# Patient Record
Sex: FEMALE | Race: BLACK OR AFRICAN AMERICAN | NOT HISPANIC OR LATINO | Employment: OTHER | URBAN - METROPOLITAN AREA
[De-identification: names, ages, dates, MRNs, and addresses within clinical notes are randomized per-mention and may not be internally consistent; named-entity substitution may affect disease eponyms.]

---

## 2021-12-03 ENCOUNTER — NURSE TRIAGE (OUTPATIENT)
Dept: ONCOLOGY | Facility: CLINIC | Age: 76
End: 2021-12-03
Payer: COMMERCIAL

## 2022-01-14 DIAGNOSIS — M06.9 RA (RHEUMATOID ARTHRITIS) (H): ICD-10-CM

## 2022-03-24 NOTE — TELEPHONE ENCOUNTER
DIAGNOSIS: Foot pain bilateral // Self Referral    APPOINTMENT DATE: 4/19/2022, 3:20 PM    NOTES STATUS DETAILS   OFFICE NOTE from other specialist Care Everywhere 3/18/2022 office visit with Abi Hamilton DPM ( Specialty)     5/28/2020, 12/20/18, 12/22/17, 4/18/17 Office visit with Que Phillips DPM ([facility])    MEDICATION LIST Internal    LABS     XRAYS (IMAGES & REPORTS) Internal/ Care Everywhere XR Six Foot Standing: 3/17/15    [facility]:  - XR Foot Right: 3/18/2022  - XR Foot Left: 3/18/2022    [facility]:  - XR Foot Left: 4/18/17, 7/19/13     3/24/2022 8:49am Fax requests sent to [facility] and [facility] for images noted above. -Pj

## 2022-03-28 ENCOUNTER — MEDICAL CORRESPONDENCE (OUTPATIENT)
Dept: HEALTH INFORMATION MANAGEMENT | Facility: CLINIC | Age: 77
End: 2022-03-28
Payer: COMMERCIAL

## 2022-03-31 ENCOUNTER — TRANSCRIBE ORDERS (OUTPATIENT)
Dept: OTHER | Age: 77
End: 2022-03-31
Payer: COMMERCIAL

## 2022-03-31 DIAGNOSIS — M20.002 FINGER DEFORMITY, ACQUIRED, LEFT: Primary | ICD-10-CM

## 2022-04-06 ENCOUNTER — OFFICE VISIT (OUTPATIENT)
Dept: PODIATRY | Facility: CLINIC | Age: 77
End: 2022-04-06
Payer: COMMERCIAL

## 2022-04-06 VITALS
SYSTOLIC BLOOD PRESSURE: 199 MMHG | BODY MASS INDEX: 19.26 KG/M2 | HEART RATE: 80 BPM | DIASTOLIC BLOOD PRESSURE: 99 MMHG | WEIGHT: 130 LBS | HEIGHT: 69 IN

## 2022-04-06 DIAGNOSIS — M79.671 PAIN IN BOTH FEET: ICD-10-CM

## 2022-04-06 DIAGNOSIS — L03.119 CELLULITIS AND ABSCESS OF FOOT, EXCEPT TOES: ICD-10-CM

## 2022-04-06 DIAGNOSIS — M05.772 RHEUMATOID ARTHRITIS INVOLVING BOTH FEET WITH POSITIVE RHEUMATOID FACTOR (H): ICD-10-CM

## 2022-04-06 DIAGNOSIS — M05.771 RHEUMATOID ARTHRITIS INVOLVING BOTH FEET WITH POSITIVE RHEUMATOID FACTOR (H): ICD-10-CM

## 2022-04-06 DIAGNOSIS — M79.672 PAIN IN BOTH FEET: ICD-10-CM

## 2022-04-06 DIAGNOSIS — S90.822S BLISTER OF LEFT FOOT, SEQUELA: Primary | ICD-10-CM

## 2022-04-06 DIAGNOSIS — L02.619 CELLULITIS AND ABSCESS OF FOOT, EXCEPT TOES: ICD-10-CM

## 2022-04-06 PROCEDURE — 99214 OFFICE O/P EST MOD 30 MIN: CPT | Mod: 25 | Performed by: PODIATRIST

## 2022-04-06 PROCEDURE — 97597 DBRDMT OPN WND 1ST 20 CM/<: CPT | Performed by: PODIATRIST

## 2022-04-06 RX ORDER — CEPHALEXIN 500 MG/1
500 CAPSULE ORAL 3 TIMES DAILY
Qty: 42 CAPSULE | Refills: 0 | Status: SHIPPED | OUTPATIENT
Start: 2022-04-06

## 2022-04-06 ASSESSMENT — PAIN SCALES - GENERAL: PAINLEVEL: WORST PAIN (10)

## 2022-04-06 NOTE — NURSING NOTE
DME FITTING    Relevant Diagnosis: Blister of left foot, [facility]  DH Shoe was fit on patient's left foot    Person(s) involved in teaching:   Patient    Brace was applied in standard Manner:  Yes  Brace fit well:  Yes  Patient reports brace to fit comfortably:  Yes    Education:   Patient shown self application and removal of brace: Yes  Patient shown how to adjust brace fit, if necessary: Yes  Patient educated on billing and return policy: Yes  Patient confirmed understanding when and how to contact clinic with concerns: Yes

## 2022-04-06 NOTE — PROGRESS NOTES
Past Medical History:   Diagnosis Date     Other specified drug dependence, continuous      Rheumatoid arthritis(714.0) (H)      Patient Active Problem List   Diagnosis     Cystocele, midline     RA (rheumatoid arthritis) (H)     S/P total knee arthroplasty     Total knee replacement status     Left knee pain     Edema     Past Surgical History:   Procedure Laterality Date     ARTHROPLASTY KNEE Left 4/30/2015    Procedure: ARTHROPLASTY KNEE;  Surgeon: Abby Cortez MD;  Location: [facility]     bunionectiomy bilateral       XR HAND SURGERY TANYA LEFT       Social History     Socioeconomic History     Marital status: Single     Spouse name: Not on file     Number of children: Not on file     Years of education: Not on file     Highest education level: Not on file   Occupational History     Not on file   Tobacco Use     Smoking status: Current Every Day Smoker     Smokeless tobacco: Not on file     Tobacco comment: too much   Substance and Sexual Activity     Alcohol use: Yes     Comment: occ     Drug use: No     Comment: in the past     Sexual activity: Yes     Partners: Male   Other Topics Concern     Not on file   Social History Narrative     Not on file     Social Determinants of Health     Financial Resource Strain: Not on file   Food Insecurity: Not on file   Transportation Needs: Not on file   Physical Activity: Not on file   Stress: Not on file   Social Connections: Not on file   Intimate Partner Violence: Not on file   Housing Stability: Not on file     Family History   Problem Relation Age of Onset     Breast Cancer Mother      Heart Disease Mother         die from it     Diabetes Father      Diabetes Brother      A1c      5.8                  4/19/21              SUBJECTIVE FINDINGS:  A 77-year-old female presents with son for left foot pain.  She relates it hurts in the arch.  For about the last week or so, it has really started hurting.  She has always had some pain in the left medial arch prominence  bilaterally, at least for several months.  Relates no injury.  Relates no fever or chills.  No specific treatment.  She relates she does have extra-depth shoes with custom foot orthotics and she wears those.  She relates she has been soaking her feet in vinegar and water.  I did review previous notes.  She was seen on 03/18 with Dr. Hamilton; notes and previous Rheumatology notes reviewed as noted in the EMR.    OBJECTIVE FINDINGS:  DP and PT are 2/4 bilaterally.  She has a flat foot type with medial arch prominence bilaterally.  She has laterally deviated toes, 1 through 5 bilaterally.  Dorsally contracted digits bilaterally.  Decreased range of motion bilaterally.  She has a left plantar arch blister with underlying ulcer that is 4 x 2.5 cm.  There is pustular drainage, positive erythema and edema.  Positive tenderness to palpation.  No odor, no calor.  It is deep through the dermis into the subcutaneous tissues.    ASSESSMENT AND PLAN:  Blister with underlying ulceration and infection, left foot.  She has foot pain bilaterally.  She has rheumatoid arthritis and foot deformities.  Diagnosis and treatment options discussed with her.  The blister was sharp debrided through the dermis into the subcutaneous tissues with a tissue cutter.  No anesthesia needed and local wound care done upon consent.  For the cellulitis, prescription for Keflex given and use discussed with her.  DH II shoe dispensed and use discussed with her.  Dressing supplies dispensed and use discussed with her.  I rinsed this with Wound Vashe, applied Iodosorb and a sterile dressing today.  I am going to have her do that daily.  She relates she is homebound.  She relates she does not leave the house without assistance or cares and feels she needs home nursing to help her with this.  Home nursing ordered for daily dressing changes.  I gave her a prescription for Voltaren gel for her foot pain and use discussed with her.  I do not want her using this  on the ulcer.  Return to clinic and see me Monday at the [facility].  DH II shoe dispensed and use discussed with her.  Previous notes reviewed.          High level of medical decision making with number and complexity of problems addressed- high, amount and/or complexity of data to be reviewed and analyzed-moderate, risk of complications and/or morbidity or mortality of patient management- high.

## 2022-04-06 NOTE — LETTER
4/6/2022         RE: Yarelis Matamoros  [address]  [address]  [place] [address]        Dear Colleague,    Thank you for referring your patient, Yarelis Matamoros, to the [facility]. Please see a copy of my visit note below.    Past Medical History:   Diagnosis Date     Other specified drug dependence, continuous      Rheumatoid arthritis(714.0) (H)      Patient Active Problem List   Diagnosis     Cystocele, midline     RA (rheumatoid arthritis) (H)     S/P total knee arthroplasty     Total knee replacement status     Left knee pain     Edema     Past Surgical History:   Procedure Laterality Date     ARTHROPLASTY KNEE Left 4/30/2015    Procedure: ARTHROPLASTY KNEE;  Surgeon: Abby Cortez MD;  [facility]     bunionectiomy bilateral       XR HAND SURGERY TANYA LEFT       Social History     Socioeconomic History     Marital status: Single     Spouse name: Not on file     Number of children: Not on file     Years of education: Not on file     Highest education level: Not on file   Occupational History     Not on file   Tobacco Use     Smoking status: Current Every Day Smoker     Smokeless tobacco: Not on file     Tobacco comment: too much   Substance and Sexual Activity     Alcohol use: Yes     Comment: occ     Drug use: No     Comment: in the past     Sexual activity: Yes     Partners: Male   Other Topics Concern     Not on file   Social History Narrative     Not on file     Social Determinants of Health     Financial Resource Strain: Not on file   Food Insecurity: Not on file   Transportation Needs: Not on file   Physical Activity: Not on file   Stress: Not on file   Social Connections: Not on file   Intimate Partner Violence: Not on file   Housing Stability: Not on file     Family History   Problem Relation Age of Onset     Breast Cancer Mother      Heart Disease Mother         die from it     Diabetes Father      Diabetes Brother      A1c      5.8                   4/19/21              SUBJECTIVE FINDINGS:  A 77-year-old female presents with son for left foot pain.  She relates it hurts in the arch.  For about the last week or so, it has really started hurting.  She has always had some pain in the left medial arch prominence bilaterally, at least for several months.  Relates no injury.  Relates no fever or chills.  No specific treatment.  She relates she does have extra-depth shoes with custom foot orthotics and she wears those.  She relates she has been soaking her feet in vinegar and water.  I did review previous notes.  She was seen on 03/18 with Dr. Hamilton; notes and previous Rheumatology notes reviewed as noted in the EMR.    OBJECTIVE FINDINGS:  DP and PT are 2/4 bilaterally.  She has a flat foot type with medial arch prominence bilaterally.  She has laterally deviated toes, 1 through 5 bilaterally.  Dorsally contracted digits bilaterally.  Decreased range of motion bilaterally.  She has a left plantar arch blister with underlying ulcer that is 4 x 2.5 cm.  There is pustular drainage, positive erythema and edema.  Positive tenderness to palpation.  No odor, no calor.  It is deep through the dermis into the subcutaneous tissues.    ASSESSMENT AND PLAN:  Blister with underlying ulceration and infection, left foot.  She has foot pain bilaterally.  She has rheumatoid arthritis and foot deformities.  Diagnosis and treatment options discussed with her.  The blister was sharp debrided through the dermis into the subcutaneous tissues with a tissue cutter.  No anesthesia needed and local wound care done upon consent.  For the cellulitis, prescription for Keflex given and use discussed with her.  DH II shoe dispensed and use discussed with her.  Dressing supplies dispensed and use discussed with her.  I rinsed this with Wound Vashe, applied Iodosorb and a sterile dressing today.  I am going to have her do that daily.  She relates she is homebound.  She relates she does not leave  "the house without assistance or cares and feels she needs home nursing to help her with this.  Home nursing ordered for daily dressing changes.  I gave her a prescription for Voltaren gel for her foot pain and use discussed with her.  I do not want her using this on the ulcer.  Return to clinic and see me Monday at the [facility].  DH II shoe dispensed and use discussed with her.  Previous notes reviewed.          High level of medical decision making with number and complexity of problems addressed- high, amount and/or complexity of data to be reviewed and analyzed-moderate, risk of complications and/or morbidity or mortality of patient management- high.      Yarelis Matamoros's chief complaint for this visit includes:  Chief Complaint   Patient presents with     New Patient     foot pain bilateral/ self ref / [facility] // Pending Imgs (PN)- BNT     PCP: Neftaly Arias    Referring Provider:  No referring provider defined for this encounter.    BP (!) 199/99 (BP Location: Left arm, Patient Position: Sitting, Cuff Size: Adult Regular)   Pulse 80   Ht 1.753 m (5' 9\")   Wt 59 kg (130 lb)   BMI 19.20 kg/m    Worst Pain (10)        Allergies   Allergen Reactions     Ciprofloxacin      Hypersensitivity       Clindamycin      hypersensitivity     Varenicline      hypersensitivity         Do you need any medication refills at today's visit?no  Isrrael Kelly CMA            Again, thank you for allowing me to participate in the care of your patient.        Sincerely,        Jose Ricketts DPM    "

## 2022-04-06 NOTE — PROGRESS NOTES
"Yarelis Matamoros's chief complaint for this visit includes:  Chief Complaint   Patient presents with     New Patient     foot pain bilateral/ self ref / [facility] // Pending Imgs (PN)- BNT     PCP: Neftaly Arias    Referring Provider:  No referring provider defined for this encounter.    BP (!) 199/99 (BP Location: Left arm, Patient Position: Sitting, Cuff Size: Adult Regular)   Pulse 80   Ht 1.753 m (5' 9\")   Wt 59 kg (130 lb)   BMI 19.20 kg/m    Worst Pain (10)        Allergies   Allergen Reactions     Ciprofloxacin      Hypersensitivity       Clindamycin      hypersensitivity     Varenicline      hypersensitivity         Do you need any medication refills at today's visit?no  Isrrael Kelly CMA        "

## 2022-04-11 ENCOUNTER — OFFICE VISIT (OUTPATIENT)
Dept: ORTHOPEDICS | Facility: CLINIC | Age: 77
End: 2022-04-11
Payer: COMMERCIAL

## 2022-04-11 DIAGNOSIS — L02.619 CELLULITIS AND ABSCESS OF FOOT, EXCEPT TOES: Primary | ICD-10-CM

## 2022-04-11 DIAGNOSIS — M05.771 RHEUMATOID ARTHRITIS INVOLVING BOTH FEET WITH POSITIVE RHEUMATOID FACTOR (H): ICD-10-CM

## 2022-04-11 DIAGNOSIS — S90.822S: ICD-10-CM

## 2022-04-11 DIAGNOSIS — L08.9: ICD-10-CM

## 2022-04-11 DIAGNOSIS — L03.119 CELLULITIS AND ABSCESS OF FOOT, EXCEPT TOES: Primary | ICD-10-CM

## 2022-04-11 DIAGNOSIS — M05.772 RHEUMATOID ARTHRITIS INVOLVING BOTH FEET WITH POSITIVE RHEUMATOID FACTOR (H): ICD-10-CM

## 2022-04-11 PROCEDURE — 99214 OFFICE O/P EST MOD 30 MIN: CPT | Performed by: PODIATRIST

## 2022-04-11 NOTE — NURSING NOTE
Reason For Visit:   Chief Complaint   Patient presents with     Follow Up     Blister with underlying ulceration and infection, left foot. Foot pain bilaterally.       Pain Assessment  Patient Currently in Pain: Denies        Allergies   Allergen Reactions     Ciprofloxacin      Hypersensitivity       Clindamycin      hypersensitivity     Varenicline      hypersensitivity           Tonie Beltran LPN

## 2022-04-11 NOTE — PROGRESS NOTES
Past Medical History:   Diagnosis Date     Other specified drug dependence, continuous      Rheumatoid arthritis(714.0) (H)      Patient Active Problem List   Diagnosis     Cystocele, midline     RA (rheumatoid arthritis) (H)     S/P total knee arthroplasty     Total knee replacement status     Left knee pain     Edema     Past Surgical History:   Procedure Laterality Date     ARTHROPLASTY KNEE Left 4/30/2015    Procedure: ARTHROPLASTY KNEE;  Surgeon: Abby Cortez MD;  [facility]: [facility]     bunionectiomy bilateral       XR HAND SURGERY TANYA LEFT       Social History     Socioeconomic History     Marital status: Single     Spouse name: Not on file     Number of children: Not on file     Years of education: Not on file     Highest education level: Not on file   Occupational History     Not on file   Tobacco Use     Smoking status: Current Every Day Smoker     Smokeless tobacco: Not on file     Tobacco comment: too much   Substance and Sexual Activity     Alcohol use: Yes     Comment: occ     Drug use: No     Comment: in the past     Sexual activity: Yes     Partners: Male   Other Topics Concern     Not on file   Social History Narrative     Not on file     Social Determinants of Health     Financial Resource Strain: Not on file   Food Insecurity: Not on file   Transportation Needs: Not on file   Physical Activity: Not on file   Stress: Not on file   Social Connections: Not on file   Intimate Partner Violence: Not on file   Housing Stability: Not on file     Family History   Problem Relation Age of Onset     Breast Cancer Mother      Heart Disease Mother         die from it     Diabetes Father      Diabetes Brother            SUBJECTIVE FINDINGS:  A 77-year-old female returns to clinic for blister with ulceration and infection, left foot.  She relates it is doing better.  She is using her DH2 shoe.  She is using the Iodosorb and the wound cleanser.  She is taking the Keflex with no problems.  She presents with  her son today.    OBJECTIVE FINDINGS:  DP and PT are 2/4 bilaterally.  She has a left plantar arch.  She still has some edema.  Decreased edema, decreased erythema.  The ulceration is closed.  There is no odor, no calor, no active drainage, decreased pain on palpation.    ASSESSMENT AND PLAN:  Blister with ulceration and infection, left foot.  She does have a flatfoot medial arch deformity bilaterally.  Diagnosis and treatment options discussed with her.  She can DC the Iodosorb gel.  I still want her cleaning this daily with wound cleanser and wrap this with Ace wrap for compression.  Finish the Keflex.  Continue the DH2 shoe.  Return to clinic and see me in 2 weeks.  Previous notes reviewed.        moderate level of medical decision making with number and complexity of problems addressed- moderate, amount and/or complexity of data to be reviewed and analyzed-limited, risk of complications and/or morbidity or mortality of patient management- moderate.

## 2022-04-11 NOTE — LETTER
4/11/2022         RE: Yarelis Matamoros  [address]  [address]  [place] [address]        Dear Colleague,    Thank you for referring your patient, Yarelis Matamoros, to the [facility] [place]. Please see a copy of my visit note below.    Past Medical History:   Diagnosis Date     Other specified drug dependence, continuous      Rheumatoid arthritis(714.0) (H)      Patient Active Problem List   Diagnosis     Cystocele, midline     RA (rheumatoid arthritis) (H)     S/P total knee arthroplasty     Total knee replacement status     Left knee pain     Edema     Past Surgical History:   Procedure Laterality Date     ARTHROPLASTY KNEE Left 4/30/2015    Procedure: ARTHROPLASTY KNEE;  Surgeon: Abby Cortez MD;  [facility]     bunionectiomy bilateral       XR HAND SURGERY TANYA LEFT       Social History     Socioeconomic History     Marital status: Single     Spouse name: Not on file     Number of children: Not on file     Years of education: Not on file     Highest education level: Not on file   Occupational History     Not on file   Tobacco Use     Smoking status: Current Every Day Smoker     Smokeless tobacco: Not on file     Tobacco comment: too much   Substance and Sexual Activity     Alcohol use: Yes     Comment: occ     Drug use: No     Comment: in the past     Sexual activity: Yes     Partners: Male   Other Topics Concern     Not on file   Social History Narrative     Not on file     Social Determinants of Health     Financial Resource Strain: Not on file   Food Insecurity: Not on file   Transportation Needs: Not on file   Physical Activity: Not on file   Stress: Not on file   Social Connections: Not on file   Intimate Partner Violence: Not on file   Housing Stability: Not on file     Family History   Problem Relation Age of Onset     Breast Cancer Mother      Heart Disease Mother         die from it     Diabetes Father      Diabetes Brother            SUBJECTIVE FINDINGS:   A 77-year-old female returns to clinic for blister with ulceration and infection, left foot.  She relates it is doing better.  She is using her DH2 shoe.  She is using the Iodosorb and the wound cleanser.  She is taking the Keflex with no problems.  She presents with her son today.    OBJECTIVE FINDINGS:  DP and PT are 2/4 bilaterally.  She has a left plantar arch.  She still has some edema.  Decreased edema, decreased erythema.  The ulceration is closed.  There is no odor, no calor, no active drainage, decreased pain on palpation.    ASSESSMENT AND PLAN:  Blister with ulceration and infection, left foot.  She does have a flatfoot medial arch deformity bilaterally.  Diagnosis and treatment options discussed with her.  She can DC the Iodosorb gel.  I still want her cleaning this daily with wound cleanser and wrap this with Ace wrap for compression.  Finish the Keflex.  Continue the DH2 shoe.  Return to clinic and see me in 2 weeks.  Previous notes reviewed.        moderate level of medical decision making with number and complexity of problems addressed- moderate, amount and/or complexity of data to be reviewed and analyzed-limited, risk of complications and/or morbidity or mortality of patient management- moderate.      Again, thank you for allowing me to participate in the care of your patient.        Sincerely,        Jose Ricketts DPM

## 2022-04-18 NOTE — TELEPHONE ENCOUNTER
Action 04/18/22 MMT   Action Taken  All records available in Epic.      DIAGNOSIS: Finger deformity/no imaging/Dr. Shantanu Irene MD at [facility]oxander   APPOINTMENT DATE: 4/20/22   NOTES STATUS DETAILS   OFFICE NOTE from referring provider Care Everywhere Telephone encounter - 3/22/22 - Dr. Irene - Rheumatology [facility]   OFFICE NOTE from other specialist Care Everywhere 11/11/21 - Dr. Conn - Rheumatology -     MEDICATION LIST Internal    LABS     CBC/DIFF Care Everywhere 4/19/21

## 2022-04-19 ENCOUNTER — PRE VISIT (OUTPATIENT)
Dept: ORTHOPEDICS | Facility: CLINIC | Age: 77
End: 2022-04-19

## 2022-04-20 ENCOUNTER — PRE VISIT (OUTPATIENT)
Dept: ORTHOPEDICS | Facility: CLINIC | Age: 77
End: 2022-04-20

## 2022-11-18 ENCOUNTER — OFFICE VISIT (OUTPATIENT)
Dept: PODIATRY | Facility: CLINIC | Age: 77
End: 2022-11-18
Payer: COMMERCIAL

## 2022-11-18 VITALS — WEIGHT: 130 LBS | BODY MASS INDEX: 19.2 KG/M2 | SYSTOLIC BLOOD PRESSURE: 158 MMHG | DIASTOLIC BLOOD PRESSURE: 92 MMHG

## 2022-11-18 DIAGNOSIS — M79.671 BILATERAL FOOT PAIN: ICD-10-CM

## 2022-11-18 DIAGNOSIS — M21.42 PES PLANUS OF BOTH FEET: Primary | ICD-10-CM

## 2022-11-18 DIAGNOSIS — M05.79 RHEUMATOID ARTHRITIS INVOLVING MULTIPLE SITES WITH POSITIVE RHEUMATOID FACTOR (H): ICD-10-CM

## 2022-11-18 DIAGNOSIS — M79.642 PAIN IN BOTH HANDS: ICD-10-CM

## 2022-11-18 DIAGNOSIS — M79.641 PAIN IN BOTH HANDS: ICD-10-CM

## 2022-11-18 DIAGNOSIS — M21.41 PES PLANUS OF BOTH FEET: Primary | ICD-10-CM

## 2022-11-18 DIAGNOSIS — M20.12 HALLUX ABDUCTO VALGUS, BILATERAL: ICD-10-CM

## 2022-11-18 DIAGNOSIS — M19.079 ARTHRITIS OF MIDFOOT: ICD-10-CM

## 2022-11-18 DIAGNOSIS — M20.11 HALLUX ABDUCTO VALGUS, BILATERAL: ICD-10-CM

## 2022-11-18 DIAGNOSIS — M79.672 BILATERAL FOOT PAIN: ICD-10-CM

## 2022-11-18 PROCEDURE — 99213 OFFICE O/P EST LOW 20 MIN: CPT | Performed by: PODIATRIST

## 2022-11-18 RX ORDER — PREDNISONE 5 MG/1
5 TABLET ORAL DAILY
COMMUNITY

## 2022-11-18 RX ORDER — NAPROXEN 500 MG/1
500 TABLET ORAL 2 TIMES DAILY WITH MEALS
COMMUNITY

## 2022-11-18 NOTE — LETTER
11/18/2022         RE: Yarelis Matamoros  [address]  [address]  [place] [address]        Dear Colleague,    Thank you for referring your patient, Yarelis Matamoros, to the [facility]. Please see a copy of my visit note below.    ASSESSMENT:  Encounter Diagnoses   Name Primary?     Pes planus of both feet Yes     Arthritis of midfoot      Hallux abducto valgus, bilateral      Bilateral foot pain      Pain in both hands      Rheumatoid arthritis involving multiple sites with positive rheumatoid factor (H)      MEDICAL DECISION MAKING:  She has severe pes planus, not flexible.  I have referred her for custom molded multi density orthoses.  She reported that she has custom shoes, and other shoes that accommodate her feet, at home.    I suggested trying Aspercreme or Voltaren cream for arthritic pain in her feet.  We discussed the option of an image guided injection into midfoot joints that might contribute her pain.  She is not interested at this time.    Still also reported hand and back pain.  She is interested in a referral to sports medicine.    Disclaimer: This note consists of symbols derived from keyboarding, dictation and/or voice recognition software. As a result, there may be errors in the script that have gone undetected. Please consider this when interpreting information found in this chart.    Virgilio Daly DPM, FACFAS, [facility] Department of Podiatry/Foot & Ankle Surgery      ____________________________________________________________________    HPI:       Niurka presents today reporting bilateral foot pain, left greater than right.  She reports having previous x-rays and having arthritis.  She is using an accommodative type shoe insert.  Remote history of bunion surgery.  Her current pain is related to the plantar medial foot.  She does use a topical cream.  *  Past Medical History:   Diagnosis Date     Other specified drug dependence, continuous       Rheumatoid arthritis(714.0)    *  *  Past Surgical History:   Procedure Laterality Date     ARTHROPLASTY KNEE Left 4/30/2015    Procedure: ARTHROPLASTY KNEE;  Surgeon: Abby Cortez MD;  Location: [facility] OR     bunionectiomy bilateral       XR HAND SURGERY TANYA LEFT     *  *  Current Outpatient Medications   Medication Sig Dispense Refill     acetaminophen (TYLENOL) 325 MG tablet Take 1-2 tablets (325-650 mg) by mouth every 6 hours as needed for mild pain 100 tablet 0     cholecalciferol 1000 UNITS TABS Take 1,000 Units by mouth daily 30 tablet 0     diclofenac (VOLTAREN) 1 % topical gel 1 gram to affected areas on feet 2-3 times daily as needed for pain. 100 g 2     hydrOXYzine (ATARAX) 25 MG tablet Take 1 tablet (25 mg) by mouth every 6 hours as needed for other (adjuvant pain) 60 tablet 0     multivitamin, therapeutic with minerals (THERA-VIT-M) TABS Take 1 tablet by mouth daily 30 each 0     naproxen (NAPROSYN) 500 MG tablet Take 500 mg by mouth 2 times daily (with meals)       pantoprazole (PROTONIX) 40 MG enteric coated tablet Take 1 tablet (40 mg) by mouth every morning (before breakfast) 30 tablet 0     predniSONE (DELTASONE) 5 MG tablet Take 5 mg by mouth daily       cephALEXin (KEFLEX) 500 MG capsule Take 1 capsule (500 mg) by mouth 3 times daily (Patient not taking: Reported on 11/18/2022) 42 capsule 0     cyclobenzaprine (FLEXERIL) 10 MG tablet Take 1 tablet (10 mg) by mouth 3 times daily as needed for muscle spasms (Patient not taking: Reported on 11/18/2022) 42 tablet 0     nicotine (NICODERM CQ) 21 MG/24HR patch 2h hr Place 1 patch onto the skin daily (Patient not taking: Reported on 11/18/2022) 30 patch 0     oxyCODONE (ROXICODONE) 5 MG immediate release tablet One PO every 6 hours as needed for pain (Patient not taking: Reported on 11/18/2022) 40 tablet 0     oxyCODONE (ROXICODONE) 5 MG immediate release tablet Take 1 tablet (5 mg) by mouth every 4 hours as needed for moderate to severe pain  (Patient not taking: Reported on 11/18/2022) 40 tablet 0     prochlorperazine (COMPAZINE) 5 MG tablet Take 1 tablet (5 mg) by mouth every 6 hours as needed for nausea or vomiting (Patient not taking: Reported on 11/18/2022) 90 tablet      senna-docusate (SENOKOT-S;PERICOLACE) 8.6-50 MG per tablet Take 1-2 tablets by mouth 2 times daily (Patient not taking: Reported on 11/18/2022) 60 tablet 0         EXAM:    Vitals: BP (!) 158/92   Wt 59 kg (130 lb)   BMI 19.20 kg/m    BMI: Body mass index is 19.2 kg/m .    Constitutional:  Yarelis Matamoros is in no apparent distress, appears well-nourished.  Cooperative with history and physical exam.    Vascular:  Pedal pulses are palpable for both the DP and PT arteries.  CFT < 3 sec.  No edema.      Neuro: Light touch sensation is intact to the L4, L5, S1 distributions  No evidence of weakness, spasticity, or contracture in the lower extremities.     Derm: Normal texture and turgor.  No erythema, ecchymosis, or cyanosis.  No open lesions.     Musculoskeletal:    Severe pes planus with complete collapse of the medial longitudinal arch bilaterally.  Prominent dorsal medial bone bilaterally.  Bilateral, rigid hallux abductus.  Lesser toes are abducted.            Again, thank you for allowing me to participate in the care of your patient.        Sincerely,        Virgilio Daly DPM

## 2022-11-18 NOTE — PATIENT INSTRUCTIONS
Thank you for choosing [facility] Podiatry / Foot & Ankle Surgery!    DR. WANG'S CLINIC LOCATIONS:     [facility] TRIAGE LINE: [phone]   [address] [place] [address] APPOINTMENTS: [phone]   [place] [address] RADIOLOGY: [phone]   (Every other Tues - Wed - Fri PM) SET UP SURGERY: [phone]    PHYSICAL THERAPY: [phone]   [facility] SPECIAL[facility] BILLING QUESTIONS: [phone]   [address] FAX: [phone]   [place] [address]    (Thurs & Fri AM)      Follow up: As needed    Next steps: Aspercream or Voltaren Gel    [facility] LOCATIONS  [facility]  [address]  [place] [address]  Phone: [phone]  Fax: [phone] [facility]  [address]  [place] [address]  Phone: [phone]   Fax: [phone]   [facility]  [address]  [place] [address]  Phone: [phone]  Fax: [phone] [facility]  [address]  [place] [address]  Phone: [phone]   Fax: [phone]   [facility]   [address]  [place] [address]  Phone: [phone]  Fax: [phone] * Please call any location listed to make an appointment for a casting/fitting. Your referral was sent to their central office and they will all have the order on file.

## 2022-11-18 NOTE — PROGRESS NOTES
ASSESSMENT:  Encounter Diagnoses   Name Primary?     Pes planus of both feet Yes     Arthritis of midfoot      Hallux abducto valgus, bilateral      Bilateral foot pain      Pain in both hands      Rheumatoid arthritis involving multiple sites with positive rheumatoid factor (H)      MEDICAL DECISION MAKING:  She has severe pes planus, not flexible.  I have referred her for custom molded multi density orthoses.  She reported that she has custom shoes, and other shoes that accommodate her feet, at home.    I suggested trying Aspercreme or Voltaren cream for arthritic pain in her feet.  We discussed the option of an image guided injection into midfoot joints that might contribute her pain.  She is not interested at this time.    Still also reported hand and back pain.  She is interested in a referral to sports medicine.    Disclaimer: This note consists of symbols derived from keyboarding, dictation and/or voice recognition software. As a result, there may be errors in the script that have gone undetected. Please consider this when interpreting information found in this chart.    Virgilio Daly DPM, FACFAS, MS    [facility] Department of Podiatry/Foot & Ankle Surgery      ____________________________________________________________________    HPI:       Niurka presents today reporting bilateral foot pain, left greater than right.  She reports having previous x-rays and having arthritis.  She is using an accommodative type shoe insert.  Remote history of bunion surgery.  Her current pain is related to the plantar medial foot.  She does use a topical cream.  *  Past Medical History:   Diagnosis Date     Other specified drug dependence, continuous      Rheumatoid arthritis(714.0)    *  *  Past Surgical History:   Procedure Laterality Date     ARTHROPLASTY KNEE Left 4/30/2015    Procedure: ARTHROPLASTY KNEE;  Surgeon: Abby Cortez MD;  [facility] OR     bunionectiomy bilateral       XR HAND SURGERY TANYA LEFT      *  *  Current Outpatient Medications   Medication Sig Dispense Refill     acetaminophen (TYLENOL) 325 MG tablet Take 1-2 tablets (325-650 mg) by mouth every 6 hours as needed for mild pain 100 tablet 0     cholecalciferol 1000 UNITS TABS Take 1,000 Units by mouth daily 30 tablet 0     diclofenac (VOLTAREN) 1 % topical gel 1 gram to affected areas on feet 2-3 times daily as needed for pain. 100 g 2     hydrOXYzine (ATARAX) 25 MG tablet Take 1 tablet (25 mg) by mouth every 6 hours as needed for other (adjuvant pain) 60 tablet 0     multivitamin, therapeutic with minerals (THERA-VIT-M) TABS Take 1 tablet by mouth daily 30 each 0     naproxen (NAPROSYN) 500 MG tablet Take 500 mg by mouth 2 times daily (with meals)       pantoprazole (PROTONIX) 40 MG enteric coated tablet Take 1 tablet (40 mg) by mouth every morning (before breakfast) 30 tablet 0     predniSONE (DELTASONE) 5 MG tablet Take 5 mg by mouth daily       cephALEXin (KEFLEX) 500 MG capsule Take 1 capsule (500 mg) by mouth 3 times daily (Patient not taking: Reported on 11/18/2022) 42 capsule 0     cyclobenzaprine (FLEXERIL) 10 MG tablet Take 1 tablet (10 mg) by mouth 3 times daily as needed for muscle spasms (Patient not taking: Reported on 11/18/2022) 42 tablet 0     nicotine (NICODERM CQ) 21 MG/24HR patch 2h hr Place 1 patch onto the skin daily (Patient not taking: Reported on 11/18/2022) 30 patch 0     oxyCODONE (ROXICODONE) 5 MG immediate release tablet One PO every 6 hours as needed for pain (Patient not taking: Reported on 11/18/2022) 40 tablet 0     oxyCODONE (ROXICODONE) 5 MG immediate release tablet Take 1 tablet (5 mg) by mouth every 4 hours as needed for moderate to severe pain (Patient not taking: Reported on 11/18/2022) 40 tablet 0     prochlorperazine (COMPAZINE) 5 MG tablet Take 1 tablet (5 mg) by mouth every 6 hours as needed for nausea or vomiting (Patient not taking: Reported on 11/18/2022) 90 tablet      senna-docusate (SENOKOT-S;PERICOLACE)  8.6-50 MG per tablet Take 1-2 tablets by mouth 2 times daily (Patient not taking: Reported on 11/18/2022) 60 tablet 0         EXAM:    Vitals: BP (!) 158/92   Wt 59 kg (130 lb)   BMI 19.20 kg/m    BMI: Body mass index is 19.2 kg/m .    Constitutional:  Yarelis Matamoros is in no apparent distress, appears well-nourished.  Cooperative with history and physical exam.    Vascular:  Pedal pulses are palpable for both the DP and PT arteries.  CFT < 3 sec.  No edema.      Neuro: Light touch sensation is intact to the L4, L5, S1 distributions  No evidence of weakness, spasticity, or contracture in the lower extremities.     Derm: Normal texture and turgor.  No erythema, ecchymosis, or cyanosis.  No open lesions.     Musculoskeletal:    Severe pes planus with complete collapse of the medial longitudinal arch bilaterally.  Prominent dorsal medial bone bilaterally.  Bilateral, rigid hallux abductus.  Lesser toes are abducted.

## 2022-11-20 ENCOUNTER — TELEPHONE (OUTPATIENT)
Dept: RHEUMATOLOGY | Facility: CLINIC | Age: 77
End: 2022-11-20

## 2022-11-20 DIAGNOSIS — M19.079 ARTHRITIS OF MIDFOOT: Primary | ICD-10-CM

## 2022-11-20 NOTE — TELEPHONE ENCOUNTER
Reason for Call:  Medication or medication refill:PT CALLED AND NEEDS YOU TO SEND HER SCRIPT TO [facility] [phone] FOR HER FOOT CREAM AS THEY HAVE NOT REC THAT YET ANNS SHE  NEEDS IT FILLED. PLEASE CALL PT BACK WITH PROGRESS    Do you use a [facility]?  Name of the pharmacy and phone number for the current request:  [facility]    Name of the medication requested: [facility]    Other request: NA    Can we leave a detailed message on this number? YES    Phone number patient can be reached at: [phone]    Best Time: ANY    Call taken on 11/20/2022 at 8:54 AM by Janet Pagan

## 2022-11-21 NOTE — TELEPHONE ENCOUNTER
SR Pool, I can place the prescription for Voltaren cream/gel electronically. Please find out the address of this [facility] pharmacy and place it as a choice in Epic.    Thank you.    Dr. Daly

## 2022-11-24 ENCOUNTER — NURSE TRIAGE (OUTPATIENT)
Dept: NURSING | Facility: CLINIC | Age: 77
End: 2022-11-24

## 2022-11-24 ENCOUNTER — TELEPHONE (OUTPATIENT)
Dept: NURSING | Facility: CLINIC | Age: 77
End: 2022-11-24

## 2022-11-24 NOTE — TELEPHONE ENCOUNTER
Pt calling to check on the status of her Voltaren gel.     Outpatient Medication Detail     Disp Refills Start End KRISTINA   diclofenac (VOLTAREN) 1 % topical gel 150 g 1 11/22/2022  --   Sig - Route: Apply 2 g topically 2 times daily Apply 2g topically 1-2 times daily as needed - Topical     Apply 2g topically 1-2 times daily as needed     Pharmacy    [facility] - [address]     Pt was given this information and was told to contact her pharmacy for     Letty Paula RN  [facility] Nurse Advisor 3:10 PM 11/24/2022

## 2022-11-24 NOTE — TELEPHONE ENCOUNTER
Patient calling to ask if medication was sent to her pharmacy on 11/22, no triage  Christa Samson RN on 11/24/2022 at 3:02 PM    Reason for Disposition    Medication questions    Caller has medicine question only, adult not sick, AND triager answers question    Additional Information    Negative: [1] Caller is not with the adult (patient) AND [2] reporting urgent symptoms    Negative: Lab result questions    Negative: [1] Intentional drug overdose AND [2] suicidal thoughts or ideas    Negative: Drug overdose and triager unable to answer question    Negative: Caller requesting a renewal or refill of a medicine patient is currently taking    Negative: Caller requesting information unrelated to medicine    Negative: Caller requesting information about COVID-19 Vaccine    Negative: Caller requesting information about Emergency Contraception    Negative: Caller requesting information about Combined Birth Control Pills    Negative: Caller requesting information about Progestin Birth Control Pills    Negative: Caller requesting information about Post-Op pain or medicines    Negative: Caller requesting a prescription antibiotic (such as Penicillin) for Strep throat and has a positive culture result    Negative: Caller requesting a prescription anti-viral med (such as Tamiflu) and has influenza (flu) symptoms    Negative: Immunization reaction suspected    Negative: Rash while taking a medicine or within 3 days of stopping it    Negative: [1] Asthma and [2] having symptoms of asthma (cough, wheezing, etc.)    Negative: [1] Symptom of illness (e.g., headache, abdominal pain, earache, vomiting) AND [2] more than mild    Negative: Breastfeeding questions about mother's medicines and diet    Negative: MORE THAN A DOUBLE DOSE of a prescription or over-the-counter (OTC) drug    Negative: [1] DOUBLE DOSE (an extra dose or lesser amount) of prescription drug AND [2] any symptoms (e.g., dizziness, nausea, pain, sleepiness)     Negative: [1] DOUBLE DOSE (an extra dose or lesser amount) of over-the-counter (OTC) drug AND [2] any symptoms (e.g., dizziness, nausea, pain, sleepiness)    Negative: Took another person's prescription drug    Negative: [1] DOUBLE DOSE (an extra dose or lesser amount) of prescription drug AND [2] NO symptoms (Exception: a double dose of antibiotics)    Negative: Diabetes drug error or overdose (e.g., took wrong type of insulin or took extra dose)    Negative: [1] Prescription not at pharmacy AND [2] was prescribed by PCP recently (Exception: triager has access to EMR and prescription is recorded there. Go to Home Care and confirm for pharmacy.)    Negative: [1] Pharmacy calling with prescription question AND [2] triager unable to answer question    Negative: [1] Caller has URGENT medicine question about med that PCP or specialist prescribed AND [2] triager unable to answer question    Negative: Medicine patch causing local rash or itching    Negative: [1] Caller has medicine question about med NOT prescribed by PCP AND [2] triager unable to answer question (e.g., compatibility with other med, storage)    Negative: Prescription request for new medicine (not a refill)    Negative: [1] Caller has NON-URGENT medicine question about med that PCP prescribed AND [2] triager unable to answer question    Negative: Caller wants to use a complementary or alternative medicine    Negative: [1] Prescription prescribed recently is not at pharmacy AND [2] triager has access to patient's EMR AND [3] prescription is recorded in the EMR    Negative: [1] DOUBLE DOSE (an extra dose or lesser amount) of over-the-counter (OTC) drug AND [2] NO symptoms    Negative: [1] DOUBLE DOSE (an extra dose or lesser amount) of antibiotic drug AND [2] NO symptoms    Protocols used: INFORMATION ONLY CALL - NO TRIAGE-A-AH, MEDICATION QUESTION CALL-A-AH

## 2023-09-07 DIAGNOSIS — M25.562 BILATERAL KNEE PAIN: Primary | ICD-10-CM

## 2023-09-07 DIAGNOSIS — M25.561 BILATERAL KNEE PAIN: Primary | ICD-10-CM

## 2023-09-14 NOTE — TELEPHONE ENCOUNTER
Action September 14, 2023 10:10 AM [place]   Action Taken Sent a request to  and [facility] for imaging.      Action September 15, 2023 9:34 AM MT   Action Taken IMAGING RESOLVED.     DIAGNOSIS: Bilateral Knee Pain   APPOINTMENT DATE: 09/15/2023   NOTES STATUS DETAILS   OFFICE NOTE from referring provider SELF    OFFICE NOTE from other specialist Internal 08/25/2023 - [facility]    07/21/2015 - Abby Cortez MD - [facility]   OPERATIVE REPORT Internal 04/30/2015 - LT TKA  07/21/2005 - RT TKA   MEDICATION LIST Internal    IMPLANT RECORD/STICKER Internal    LABS     DEXA PACS HP:  06/20/2023 - Hips/Spine   XRAYS (IMAGES & REPORTS) PACS Internal:  2015, 2005 - Knee, Hip, Femur    [facility]:  07/19/2013 - RT Knee

## 2023-09-15 ENCOUNTER — PRE VISIT (OUTPATIENT)
Dept: ORTHOPEDICS | Facility: CLINIC | Age: 78
End: 2023-09-15

## 2023-10-13 DIAGNOSIS — M25.561 BILATERAL KNEE PAIN: Primary | ICD-10-CM

## 2023-10-13 DIAGNOSIS — M25.562 BILATERAL KNEE PAIN: Primary | ICD-10-CM

## 2023-10-16 NOTE — TELEPHONE ENCOUNTER
DIAGNOSIS: Bilateral Knee Pain   APPOINTMENT DATE: 10/19/2023   NOTES STATUS DETAILS   OFFICE NOTE from referring provider SELF    OFFICE NOTE from other specialist Internal 08/25/2023 - [facility]    07/21/2015 - Abby Cortez MD - [facility]   OPERATIVE REPORT Internal 04/30/2015 - LT TKA  07/21/2005 - RT TKA   MEDICATION LIST Internal    IMPLANT RECORD/STICKER Internal    LABS     DEXA PACS HP:  06/20/2023 - Hips/Spine   XRAYS (IMAGES & REPORTS) PACS Internal:  2015, 2005 - Knee, Hip, Femur    [facility]:  07/19/2013 - RT Knee

## 2023-10-19 ENCOUNTER — OFFICE VISIT (OUTPATIENT)
Dept: ORTHOPEDICS | Facility: CLINIC | Age: 78
End: 2023-10-19
Payer: COMMERCIAL

## 2023-10-19 ENCOUNTER — ANCILLARY PROCEDURE (OUTPATIENT)
Dept: GENERAL RADIOLOGY | Facility: CLINIC | Age: 78
End: 2023-10-19
Attending: ORTHOPAEDIC SURGERY
Payer: COMMERCIAL

## 2023-10-19 ENCOUNTER — PRE VISIT (OUTPATIENT)
Dept: ORTHOPEDICS | Facility: CLINIC | Age: 78
End: 2023-10-19

## 2023-10-19 VITALS — HEIGHT: 66 IN | BODY MASS INDEX: 20.89 KG/M2 | WEIGHT: 130 LBS

## 2023-10-19 DIAGNOSIS — M25.562 BILATERAL KNEE PAIN: ICD-10-CM

## 2023-10-19 DIAGNOSIS — M25.561 BILATERAL KNEE PAIN: ICD-10-CM

## 2023-10-19 DIAGNOSIS — Z96.653 STATUS POST TOTAL KNEE REPLACEMENT, BILATERAL: Primary | ICD-10-CM

## 2023-10-19 PROCEDURE — 99203 OFFICE O/P NEW LOW 30 MIN: CPT | Performed by: ORTHOPAEDIC SURGERY

## 2023-10-19 PROCEDURE — 73562 X-RAY EXAM OF KNEE 3: CPT | Mod: LT | Performed by: RADIOLOGY

## 2023-10-19 NOTE — PROGRESS NOTES
Assessment: This is a 78 year old with stable revision total knee. Routine follow-up for previous Dr Cortez. Discussed follow-up protocol.     Plan:  RTC in 5 years, sooner if issues.     Chief Complaint: Consult (Bilateral Knee pain, previous surg with Dr. Cortez // patient not currently having pain or issues, only occasional pain // interested in a referral to a hand surgeon )    Physician:  No ref. provider found    HPI: Yarelis Matamoros is a 78 year old female who presents today for establishment of care. Currently asymptomatic.       MEDICAL HISTORY:   Past Medical History:   Diagnosis Date    Other specified drug dependence, continuous     Rheumatoid arthritis(714.0)        Medications:     Current Outpatient Medications:     acetaminophen (TYLENOL) 325 MG tablet, Take 1-2 tablets (325-650 mg) by mouth every 6 hours as needed for mild pain, Disp: 100 tablet, Rfl: 0    naproxen (NAPROSYN) 500 MG tablet, Take 500 mg by mouth 2 times daily (with meals), Disp: , Rfl:     predniSONE (DELTASONE) 5 MG tablet, Take 5 mg by mouth daily, Disp: , Rfl:     cephALEXin (KEFLEX) 500 MG capsule, Take 1 capsule (500 mg) by mouth 3 times daily (Patient not taking: Reported on 11/18/2022), Disp: 42 capsule, Rfl: 0    cholecalciferol 1000 UNITS TABS, Take 1,000 Units by mouth daily (Patient not taking: Reported on 10/19/2023), Disp: 30 tablet, Rfl: 0    cyclobenzaprine (FLEXERIL) 10 MG tablet, Take 1 tablet (10 mg) by mouth 3 times daily as needed for muscle spasms (Patient not taking: Reported on 11/18/2022), Disp: 42 tablet, Rfl: 0    diclofenac (VOLTAREN) 1 % topical gel, Apply 2 g topically 2 times daily Apply 2g topically 1-2 times daily as needed (Patient not taking: Reported on 10/19/2023), Disp: 150 g, Rfl: 1    diclofenac (VOLTAREN) 1 % topical gel, 1 gram to affected areas on feet 2-3 times daily as needed for pain. (Patient not taking: Reported on 10/19/2023), Disp: 100 g, Rfl: 2    hydrOXYzine (ATARAX) 25 MG tablet,  Take 1 tablet (25 mg) by mouth every 6 hours as needed for other (adjuvant pain) (Patient not taking: Reported on 10/19/2023), Disp: 60 tablet, Rfl: 0    multivitamin, therapeutic with minerals (THERA-VIT-M) TABS, Take 1 tablet by mouth daily (Patient not taking: Reported on 10/19/2023), Disp: 30 each, Rfl: 0    nicotine (NICODERM CQ) 21 MG/24HR patch 2h hr, Place 1 patch onto the skin daily (Patient not taking: Reported on 11/18/2022), Disp: 30 patch, Rfl: 0    oxyCODONE (ROXICODONE) 5 MG immediate release tablet, One PO every 6 hours as needed for pain (Patient not taking: Reported on 11/18/2022), Disp: 40 tablet, Rfl: 0    oxyCODONE (ROXICODONE) 5 MG immediate release tablet, Take 1 tablet (5 mg) by mouth every 4 hours as needed for moderate to severe pain (Patient not taking: Reported on 11/18/2022), Disp: 40 tablet, Rfl: 0    pantoprazole (PROTONIX) 40 MG enteric coated tablet, Take 1 tablet (40 mg) by mouth every morning (before breakfast) (Patient not taking: Reported on 10/19/2023), Disp: 30 tablet, Rfl: 0    prochlorperazine (COMPAZINE) 5 MG tablet, Take 1 tablet (5 mg) by mouth every 6 hours as needed for nausea or vomiting (Patient not taking: Reported on 11/18/2022), Disp: 90 tablet, Rfl:     senna-docusate (SENOKOT-S;PERICOLACE) 8.6-50 MG per tablet, Take 1-2 tablets by mouth 2 times daily (Patient not taking: Reported on 11/18/2022), Disp: 60 tablet, Rfl: 0    Allergies: Varenicline, Ciprofloxacin, and Clindamycin    SURGICAL HISTORY:   Past Surgical History:   Procedure Laterality Date    ARTHROPLASTY KNEE Left 4/30/2015    Procedure: ARTHROPLASTY KNEE;  Surgeon: Abby Cortez MD;  Location: [facility]    bunionectiomy bilateral      XR HAND SURGERY TANYA LEFT         FAMILY HISTORY:   Family History   Problem Relation Age of Onset    Breast Cancer Mother     Heart Disease Mother         die from it    Diabetes Father     Diabetes Brother        SOCIAL HISTORY:   Social History     Tobacco Use     "Smoking status: Every Day    Smokeless tobacco: Not on file    Tobacco comments:     too much   Substance Use Topics    Alcohol use: Yes     Comment: occ       REVIEW OF SYSTEMS:  The comprehensive review of systems from the intake form was reviewed with the patient.  No fever, weight change or fatigue. No dry eyes. No oral ulcers, sore throat or voice change. No palpitations, syncope, angina or edema.  No chest pain, excessive sleepiness, shortness of breath or hemoptysis.   No abdominal pain, nausea, vomiting, diarrhea or heartburn.  No skin rash. No focal weakness or numbness. No bleeding or lymphadenopathy. No rhinitis or hives.     Exam:  On physical examination the patient appears the stated age, is in no acute distress, affect is appropriate, and breathing is non-labored.  Vitals are documented in the EMR and have been reviewed:    Ht 1.676 m (5' 6\")   Wt 59 kg (130 lb)   BMI 20.98 kg/m    5' 6\"  Body mass index is 20.98 kg/m .    Rises from chair:  Gait:  Gains the exam table:    Right  Knee  Appearance: benign  Clinical alignment:neutral   Effusion:no  Tenderness to palpation:no  Extension:09  Flexion: 120  Collateral ligaments: intact  Stable in mid-flexion    Left Knee  Appearance: benign  Clinical alignment: neutral   Effusion:no  Tenderness to palpation:no  Extension:0  Flexion: 110  Collateral ligaments: intact  Stable in mid-flexion    Hip examination: benign hip ROM with groin or anterior thigh symptoms.     Distally, the circulatory, motor, and sensation exam is intact with 5/5 EHL, gastroc-soleus, and tibialis anterior.    Sensation to light touch is intact.    Dorsalis pedis and posterior tibialis pulses are palpable.    There are no sores on the feet, no bruising, and no lymphedema.    X-rays:   Order  XR Knee Bilateral 3 Views [[id number]] (Order [id number])  Exam Information    Exam Date Exam Time Accession # Performing Department Results    10/19/23  1:21 PM [id number] [facility] [place]      PACS Images     Show images for XR Knee Bilateral 3 Views     Study Result    Narrative & Impression   3views right and left knee radiographs 10/19/2023 1:34 PM     History: Bilateral knee pain; Bilateral knee pain     Comparison: Left knee xrays 7/21/2015. Bilateral knee x-rays  8/11/2014.     Findings:     2 Standing AP view of bilateral knees, lateral and patellofemoral  views of the right and left knee were obtained.      Bones appear osteopenic.     Left:      Postsurgical changes of a revised left knee arthroplasty with long  femoral stem component. Components are well-seated. No evidence for  hardware failure. Healed fracture deformity of distal femoral shaft.     No acute osseous abnormality.  No significant joint effusion.     No patellar tilt or lateral subluxation.     Peripheral vascular calcifications.     Right:       Post surgical changes of right total knee arthroplasty. Components are  well seated. No evidence of hardware failure.     No acute osseous abnormality.  No joint effusion.     Changes of patellar resurfacing. No patellar tilt or lateral  subluxation.     Peripheral vascular calcifications.                                                                      Impression:  1. Stable postsurgical changes of left total knee arthroplast revision  without evidence of hardware complication. Healed left femoral  periprosthetic fracture.  2. Stable post surgical changes of right total knee arthroplasty  without evidence of hardware complication.     I have personally reviewed the examination and initial interpretation  and I agree with the findings.     ADA SHERITA         SYSTEM ID:  [id number]

## 2023-10-19 NOTE — LETTER
10/19/2023         RE: Yarelis Matamoros  [address]  [place] [address]        Dear Colleague,    Thank you for referring your patient, Yarelis Matamoros, to the [facility] [place]. Please see a copy of my visit note below.    Assessment: This is a 78 year old with stable revision total knee. Routine follow-up for previous Dr Cortez. Discussed follow-up protocol.     Plan:  RTC in 5 years, sooner if issues.     Chief Complaint: Consult (Bilateral Knee pain, previous surg with Dr. Cortez // patient not currently having pain or issues, only occasional pain // interested in a referral to a hand surgeon )    Physician:  No ref. provider found    HPI: Yarelis Matamoros is a 78 year old female who presents today for establishment of care. Currently asymptomatic.       MEDICAL HISTORY:   Past Medical History:   Diagnosis Date    Other specified drug dependence, continuous     Rheumatoid arthritis(714.0)        Medications:     Current Outpatient Medications:     acetaminophen (TYLENOL) 325 MG tablet, Take 1-2 tablets (325-650 mg) by mouth every 6 hours as needed for mild pain, Disp: 100 tablet, Rfl: 0    naproxen (NAPROSYN) 500 MG tablet, Take 500 mg by mouth 2 times daily (with meals), Disp: , Rfl:     predniSONE (DELTASONE) 5 MG tablet, Take 5 mg by mouth daily, Disp: , Rfl:     cephALEXin (KEFLEX) 500 MG capsule, Take 1 capsule (500 mg) by mouth 3 times daily (Patient not taking: Reported on 11/18/2022), Disp: 42 capsule, Rfl: 0    cholecalciferol 1000 UNITS TABS, Take 1,000 Units by mouth daily (Patient not taking: Reported on 10/19/2023), Disp: 30 tablet, Rfl: 0    cyclobenzaprine (FLEXERIL) 10 MG tablet, Take 1 tablet (10 mg) by mouth 3 times daily as needed for muscle spasms (Patient not taking: Reported on 11/18/2022), Disp: 42 tablet, Rfl: 0    diclofenac (VOLTAREN) 1 % topical gel, Apply 2 g topically 2 times daily Apply 2g topically 1-2 times daily as needed (Patient not  taking: Reported on 10/19/2023), Disp: 150 g, Rfl: 1    diclofenac (VOLTAREN) 1 % topical gel, 1 gram to affected areas on feet 2-3 times daily as needed for pain. (Patient not taking: Reported on 10/19/2023), Disp: 100 g, Rfl: 2    hydrOXYzine (ATARAX) 25 MG tablet, Take 1 tablet (25 mg) by mouth every 6 hours as needed for other (adjuvant pain) (Patient not taking: Reported on 10/19/2023), Disp: 60 tablet, Rfl: 0    multivitamin, therapeutic with minerals (THERA-VIT-M) TABS, Take 1 tablet by mouth daily (Patient not taking: Reported on 10/19/2023), Disp: 30 each, Rfl: 0    nicotine (NICODERM CQ) 21 MG/24HR patch 2h hr, Place 1 patch onto the skin daily (Patient not taking: Reported on 11/18/2022), Disp: 30 patch, Rfl: 0    oxyCODONE (ROXICODONE) 5 MG immediate release tablet, One PO every 6 hours as needed for pain (Patient not taking: Reported on 11/18/2022), Disp: 40 tablet, Rfl: 0    oxyCODONE (ROXICODONE) 5 MG immediate release tablet, Take 1 tablet (5 mg) by mouth every 4 hours as needed for moderate to severe pain (Patient not taking: Reported on 11/18/2022), Disp: 40 tablet, Rfl: 0    pantoprazole (PROTONIX) 40 MG enteric coated tablet, Take 1 tablet (40 mg) by mouth every morning (before breakfast) (Patient not taking: Reported on 10/19/2023), Disp: 30 tablet, Rfl: 0    prochlorperazine (COMPAZINE) 5 MG tablet, Take 1 tablet (5 mg) by mouth every 6 hours as needed for nausea or vomiting (Patient not taking: Reported on 11/18/2022), Disp: 90 tablet, Rfl:     senna-docusate (SENOKOT-S;PERICOLACE) 8.6-50 MG per tablet, Take 1-2 tablets by mouth 2 times daily (Patient not taking: Reported on 11/18/2022), Disp: 60 tablet, Rfl: 0    Allergies: Varenicline, Ciprofloxacin, and Clindamycin    SURGICAL HISTORY:   Past Surgical History:   Procedure Laterality Date    ARTHROPLASTY KNEE Left 4/30/2015    Procedure: ARTHROPLASTY KNEE;  Surgeon: Abby Cortez MD;  [facility]    bunionectiomy bilateral      XR  "HAND SURGERY TANYA LEFT         FAMILY HISTORY:   Family History   Problem Relation Age of Onset    Breast Cancer Mother     Heart Disease Mother         die from it    Diabetes Father     Diabetes Brother        SOCIAL HISTORY:   Social History     Tobacco Use    Smoking status: Every Day    Smokeless tobacco: Not on file    Tobacco comments:     too much   Substance Use Topics    Alcohol use: Yes     Comment: occ       REVIEW OF SYSTEMS:  The comprehensive review of systems from the intake form was reviewed with the patient.  No fever, weight change or fatigue. No dry eyes. No oral ulcers, sore throat or voice change. No palpitations, syncope, angina or edema.  No chest pain, excessive sleepiness, shortness of breath or hemoptysis.   No abdominal pain, nausea, vomiting, diarrhea or heartburn.  No skin rash. No focal weakness or numbness. No bleeding or lymphadenopathy. No rhinitis or hives.     Exam:  On physical examination the patient appears the stated age, is in no acute distress, affect is appropriate, and breathing is non-labored.  Vitals are documented in the EMR and have been reviewed:    Ht 1.676 m (5' 6\")   Wt 59 kg (130 lb)   BMI 20.98 kg/m    5' 6\"  Body mass index is 20.98 kg/m .    Rises from chair:  Gait:  Gains the exam table:    Right  Knee  Appearance: benign  Clinical alignment:neutral   Effusion:no  Tenderness to palpation:no  Extension:09  Flexion: 120  Collateral ligaments: intact  Stable in mid-flexion    Left Knee  Appearance: benign  Clinical alignment: neutral   Effusion:no  Tenderness to palpation:no  Extension:0  Flexion: 110  Collateral ligaments: intact  Stable in mid-flexion    Hip examination: benign hip ROM with groin or anterior thigh symptoms.     Distally, the circulatory, motor, and sensation exam is intact with 5/5 EHL, gastroc-soleus, and tibialis anterior.    Sensation to light touch is intact.    Dorsalis pedis and posterior tibialis pulses are palpable.    There are no " sores on the feet, no bruising, and no lymphedema.    X-rays:   Order  XR Knee Bilateral 3 Views [[id number]] (Order [id number])  Exam Information    Exam Date Exam Time Accession # Performing Department Results    10/19/23  1:21 PM [id number] [facility] [place]      PACS Images     Show images for XR Knee Bilateral 3 Views     Study Result    Narrative & Impression   3views right and left knee radiographs 10/19/2023 1:34 PM     History: Bilateral knee pain; Bilateral knee pain     Comparison: Left knee xrays 7/21/2015. Bilateral knee x-rays  8/11/2014.     Findings:     2 Standing AP view of bilateral knees, lateral and patellofemoral  views of the right and left knee were obtained.      Bones appear osteopenic.     Left:      Postsurgical changes of a revised left knee arthroplasty with long  femoral stem component. Components are well-seated. No evidence for  hardware failure. Healed fracture deformity of distal femoral shaft.     No acute osseous abnormality.  No significant joint effusion.     No patellar tilt or lateral subluxation.     Peripheral vascular calcifications.     Right:       Post surgical changes of right total knee arthroplasty. Components are  well seated. No evidence of hardware failure.     No acute osseous abnormality.  No joint effusion.     Changes of patellar resurfacing. No patellar tilt or lateral  subluxation.     Peripheral vascular calcifications.                                                                      Impression:  1. Stable postsurgical changes of left total knee arthroplast revision  without evidence of hardware complication. Healed left femoral  periprosthetic fracture.  2. Stable post surgical changes of right total knee arthroplasty  without evidence of hardware complication.     I have personally reviewed the examination and initial interpretation  and I agree with the findings.     ADA [employer]         SYSTEM ID:  [id number]            Everardo Mulligan MD

## 2023-10-19 NOTE — NURSING NOTE
"Reason For Visit:   Chief Complaint   Patient presents with    Consult     Bilateral Knee pain, previous surg with Dr. Cortez // patient not currently having pain or issues, only occasional pain // interested in a referral to a hand surgeon        Ht 1.676 m (5' 6\")   Wt 59 kg (130 lb)   BMI 20.98 kg/m      Pain Assessment  Patient Currently in Pain: Yes  0-10 Pain Scale: 3 (pain with weight bearing, flexion, extension)    Driss Stokes, EMT    "

## 2024-03-08 DIAGNOSIS — Z96.653 STATUS POST TOTAL KNEE REPLACEMENT, BILATERAL: Primary | ICD-10-CM

## 2024-03-19 ENCOUNTER — ANCILLARY PROCEDURE (OUTPATIENT)
Dept: GENERAL RADIOLOGY | Facility: CLINIC | Age: 79
End: 2024-03-19
Attending: ORTHOPAEDIC SURGERY
Payer: COMMERCIAL

## 2024-03-19 ENCOUNTER — OFFICE VISIT (OUTPATIENT)
Dept: ORTHOPEDICS | Facility: CLINIC | Age: 79
End: 2024-03-19
Payer: COMMERCIAL

## 2024-03-19 DIAGNOSIS — M05.79 RHEUMATOID ARTHRITIS INVOLVING MULTIPLE SITES WITH POSITIVE RHEUMATOID FACTOR (H): Primary | ICD-10-CM

## 2024-03-19 DIAGNOSIS — G89.29 CHRONIC PAIN OF BOTH KNEES: ICD-10-CM

## 2024-03-19 DIAGNOSIS — M25.561 CHRONIC PAIN OF BOTH KNEES: ICD-10-CM

## 2024-03-19 DIAGNOSIS — Z96.653 STATUS POST TOTAL KNEE REPLACEMENT, BILATERAL: ICD-10-CM

## 2024-03-19 DIAGNOSIS — M05.79 RHEUMATOID ARTHRITIS INVOLVING MULTIPLE SITES WITH POSITIVE RHEUMATOID FACTOR (H): ICD-10-CM

## 2024-03-19 DIAGNOSIS — M25.562 CHRONIC PAIN OF BOTH KNEES: ICD-10-CM

## 2024-03-19 PROCEDURE — 99214 OFFICE O/P EST MOD 30 MIN: CPT | Performed by: ORTHOPAEDIC SURGERY

## 2024-03-19 PROCEDURE — 77073 BONE LENGTH STUDIES: CPT | Mod: 76 | Performed by: STUDENT IN AN ORGANIZED HEALTH CARE EDUCATION/TRAINING PROGRAM

## 2024-03-19 PROCEDURE — 77073 BONE LENGTH STUDIES: CPT | Performed by: STUDENT IN AN ORGANIZED HEALTH CARE EDUCATION/TRAINING PROGRAM

## 2024-03-19 PROCEDURE — 72082 X-RAY EXAM ENTIRE SPI 2/3 VW: CPT | Performed by: STUDENT IN AN ORGANIZED HEALTH CARE EDUCATION/TRAINING PROGRAM

## 2024-03-19 NOTE — LETTER
3/19/2024         RE: Yarelis Matamoros  [address]  [place] [address]        Dear Colleague,    Thank you for referring your patient, Yarelis Matamoros, to the [facility] [place]. Please see a copy of my visit note below.    Spine Surgery Consultation    REFERRING PHYSICIAN: Referred Self   PRIMARY CARE PHYSICIAN: Neftaly Arias           Chief Complaint:   Appointment of the Pelvis (Patient came as a referral from Dr. Cortez. Intermittent back pain, comes and goes but currently no pain. )      History of Present Illness:  Symptom Profile Including: location of symptoms, onset, severity, exacerbating/alleviating factors, previous treatments:        Yarelis Matamoros is a 79 year old female who is referred by Dr. Cottrell for inability to stand upright.  She is a longstanding history of rheumatoid arthritis.  She is very positively sagittal imbalance.  She mostly mobilizes with a wheelchair.  Standing causes severe low back pain.         Past Medical History:     Past Medical History:   Diagnosis Date    Other specified drug dependence, continuous     Rheumatoid arthritis(714.0)             Past Surgical History:     Past Surgical History:   Procedure Laterality Date    ARTHROPLASTY KNEE Left 4/30/2015    Procedure: ARTHROPLASTY KNEE;  Surgeon: Abby Cortez MD;  [facility]: [facility]    bunionectiomy bilateral      XR HAND SURGERY TANYA LEFT              Social History:     Social History     Tobacco Use    Smoking status: Every Day    Smokeless tobacco: Not on file    Tobacco comments:     too much   Substance Use Topics    Alcohol use: Yes     Comment: occ            Family History:     Family History   Problem Relation Age of Onset    Breast Cancer Mother     Heart Disease Mother         die from it    Diabetes Father     Diabetes Brother             Allergies:     Allergies   Allergen Reactions    Varenicline Other (See Comments)     hypersensitivity    hypersensitivity   PN:  confusion, depression.    PN: confusion, depression.    Ciprofloxacin Rash     Hypersensitivity    On both clindamycin and ciprofloxacin when developed a rash, so both stopped 8/6/2011   Hypersensitivity    Clindamycin Rash     hypersensitivity    On both clindamycin and ciprofloxacin when developed rash.  Both were stopped 8/6/2011   hypersensitivity    On both clindamycin and ciprofloxacin when developed rash.  Both were stopped 8/6/2011            Medications:     Current Outpatient Medications   Medication    acetaminophen (TYLENOL) 325 MG tablet    cephALEXin (KEFLEX) 500 MG capsule    cholecalciferol 1000 UNITS TABS    cyclobenzaprine (FLEXERIL) 10 MG tablet    diclofenac (VOLTAREN) 1 % topical gel    diclofenac (VOLTAREN) 1 % topical gel    hydrOXYzine (ATARAX) 25 MG tablet    multivitamin, therapeutic with minerals (THERA-VIT-M) TABS    naproxen (NAPROSYN) 500 MG tablet    nicotine (NICODERM CQ) 21 MG/24HR patch 2h hr    oxyCODONE (ROXICODONE) 5 MG immediate release tablet    oxyCODONE (ROXICODONE) 5 MG immediate release tablet    pantoprazole (PROTONIX) 40 MG enteric coated tablet    predniSONE (DELTASONE) 5 MG tablet    prochlorperazine (COMPAZINE) 5 MG tablet    senna-docusate (SENOKOT-S;PERICOLACE) 8.6-50 MG per tablet     No current facility-administered medications for this visit.             Review of Systems:     A 10 point ROS was performed and reviewed. Specific responses to these questions are noted at the end of the document.         Physical Exam:   Vitals: There were no vitals taken for this visit.  Constitutional: awake, alert, cooperative, no apparent distress, appears stated age.    Eyes: The sclera are white.  Ears, Nose, Throat: The trachea is midline.  Psychiatric: The patient has a normal affect.  Respiratory: breathing non-labored  Cardiovascular: The extremities are warm and perfused.  Skin: no obvious rashes or lesions.  Musculoskeletal, Neurologic, and Spine:            Lumbar  Spine:    Appearance - No gross stepoffs or deformities    Motor -     L2-3: Hip flexion 5/5 R and 5/5 L strength          L3/4:  Knee extension R 5/5 and L 5/5 strength         L4/5:  Foot dorsiflexion R 5/5 L 5/5 and       EHL dorsiflexion R 4/5 L 4/5 strength         S1:  Plantarflexion/Peroneal Muscles  R 5/5 and L 5/5 strength    Sensation: intact to light touch L3-S1 distribution BLE      Neurologic:      REFLEXES Left Right                  Patella 1+ 1+   Ankle jerk 1+ 1+   Babinski No upgoing great toe No upgoing great toe   Clonus 0 beats 0 beats     Hip Exam:  No pain with hip log roll and no tenderness over the greater trochanters.    Alignment:  Severe positive sagittal imbalance           Imaging:   We ordered and independently reviewed new radiographs at this clinic visit. The results were discussed with the patient.  Findings include:    Standing scoliosis radiographs today show severe positive sagittal imbalance with a long kyphotic thoracolumbar curve             Assessment and Plan:   Assessment:  79 year old female with rheumatoid arthritis and severe positive sagittal imbalance.     Plan:  Given her age and multiple medical issues I would not recommend a major thoracolumbar reconstruction.  I explained this involves putting in screws and rods and that with her rheumatoid disease and severe deformity she is at significant risk of fracture, infection and medical complications and I do not personally think I can get her through the surgery safely so I recommended against pursuing surgical intervention in her case.    I recommended a referral to physical therapy and the pain clinic for her low back pains.    Incidentally she also has rheumatoid type hands and she wondered if tendon transfers could be done.  I agreed to place a referral to our hand team so she could discuss this with them.      Respectfully,  Ruiz Hurt MD  Spine Surgery  [facility]

## 2024-03-19 NOTE — NURSING NOTE
Reason For Visit:   Chief Complaint   Patient presents with    Pelvis - Appointment     Patient came as a referral from Dr. Cortez. Intermittent back pain, comes and goes but currently no pain.        Primary MD: Neftaly Arias  Ref. MD: Dana      Currently working? No.      There were no vitals taken for this visit.         Oswestry (JOYCE) Questionnaire         No data to display                     Neck Disability Index (NDI) Questionnaire         No data to display                       Visual Analog Pain Scale  Back Pain Scale 0-10: 0  Right leg pain: 0  Left leg pain: 0    Promis 10 Assessment        10/19/2023     1:12 PM   PROMIS 10   In general, would you say your health is: Good   In general, would you say your quality of life is: Very good   In general, how would you rate your physical health? Good   In general, how would you rate your mental health, including your mood and your ability to think? Very good   In general, how would you rate your satisfaction with your social activities and relationships? Excellent   In general, please rate how well you carry out your usual social activities and roles Very good   To what extent are you able to carry out your everyday physical activities such as walking, climbing stairs, carrying groceries, or moving a chair? Mostly   In the past 7 days, how often have you been bothered by emotional problems such as feeling anxious, depressed, or irritable? Rarely   In the past 7 days, how would you rate your fatigue on average? Mild   In the past 7 days, how would you rate your pain on average, where 0 means no pain, and 10 means worst imaginable pain? 3   In general, would you say your health is: 3   In general, would you say your quality of life is: 4   In general, how would you rate your physical health? 3   In general, how would you rate your mental health, including your mood and your ability to think? 4   In general, how would you rate your satisfaction with your  social activities and relationships? 5   In general, please rate how well you carry out your usual social activities and roles. (This includes activities at home, at work and in your community, and responsibilities as a parent, child, spouse, employee, friend, etc.) 4   To what extent are you able to carry out your everyday physical activities such as walking, climbing stairs, carrying groceries, or moving a chair? 4   In the past 7 days, how often have you been bothered by emotional problems such as feeling anxious, depressed, or irritable? 2   In the past 7 days, how would you rate your fatigue on average? 2   In the past 7 days, how would you rate your pain on average, where 0 means no pain, and 10 means worst imaginable pain? 3   Global Mental Health Score 17   Global Physical Health Score 15   PROMIS TOTAL - SUBSCORES 32                Rebekah Gregorio RN

## 2024-03-19 NOTE — PROGRESS NOTES
Spine Surgery Consultation    REFERRING PHYSICIAN: Referred Self   PRIMARY CARE PHYSICIAN: Neftaly Arias           Chief Complaint:   Appointment of the Pelvis (Patient came as a referral from Dr. Cortez. Intermittent back pain, comes and goes but currently no pain. )      History of Present Illness:  Symptom Profile Including: location of symptoms, onset, severity, exacerbating/alleviating factors, previous treatments:        Yarelis Matamoros is a 79 year old female who is referred by Dr. Cottrell for inability to stand upright.  She is a longstanding history of rheumatoid arthritis.  She is very positively sagittal imbalance.  She mostly mobilizes with a wheelchair.  Standing causes severe low back pain.         Past Medical History:     Past Medical History:   Diagnosis Date    Other specified drug dependence, continuous     Rheumatoid arthritis(714.0)             Past Surgical History:     Past Surgical History:   Procedure Laterality Date    ARTHROPLASTY KNEE Left 4/30/2015    Procedure: ARTHROPLASTY KNEE;  Surgeon: Abby Cortez MD;  Location: [facility] OR    bunionectiomy bilateral      XR HAND SURGERY TANYA LEFT              Social History:     Social History     Tobacco Use    Smoking status: Every Day    Smokeless tobacco: Not on file    Tobacco comments:     too much   Substance Use Topics    Alcohol use: Yes     Comment: occ            Family History:     Family History   Problem Relation Age of Onset    Breast Cancer Mother     Heart Disease Mother         die from it    Diabetes Father     Diabetes Brother             Allergies:     Allergies   Allergen Reactions    Varenicline Other (See Comments)     hypersensitivity    hypersensitivity   PN: confusion, depression.    PN: confusion, depression.    Ciprofloxacin Rash     Hypersensitivity    On both clindamycin and ciprofloxacin when developed a rash, so both stopped 8/6/2011   Hypersensitivity    Clindamycin Rash     hypersensitivity    On both  clindamycin and ciprofloxacin when developed rash.  Both were stopped 8/6/2011   hypersensitivity    On both clindamycin and ciprofloxacin when developed rash.  Both were stopped 8/6/2011            Medications:     Current Outpatient Medications   Medication    acetaminophen (TYLENOL) 325 MG tablet    cephALEXin (KEFLEX) 500 MG capsule    cholecalciferol 1000 UNITS TABS    cyclobenzaprine (FLEXERIL) 10 MG tablet    diclofenac (VOLTAREN) 1 % topical gel    diclofenac (VOLTAREN) 1 % topical gel    hydrOXYzine (ATARAX) 25 MG tablet    multivitamin, therapeutic with minerals (THERA-VIT-M) TABS    naproxen (NAPROSYN) 500 MG tablet    nicotine (NICODERM CQ) 21 MG/24HR patch 2h hr    oxyCODONE (ROXICODONE) 5 MG immediate release tablet    oxyCODONE (ROXICODONE) 5 MG immediate release tablet    pantoprazole (PROTONIX) 40 MG enteric coated tablet    predniSONE (DELTASONE) 5 MG tablet    prochlorperazine (COMPAZINE) 5 MG tablet    senna-docusate (SENOKOT-S;PERICOLACE) 8.6-50 MG per tablet     No current facility-administered medications for this visit.             Review of Systems:     A 10 point ROS was performed and reviewed. Specific responses to these questions are noted at the end of the document.         Physical Exam:   Vitals: There were no vitals taken for this visit.  Constitutional: awake, alert, cooperative, no apparent distress, appears stated age.    Eyes: The sclera are white.  Ears, Nose, Throat: The trachea is midline.  Psychiatric: The patient has a normal affect.  Respiratory: breathing non-labored  Cardiovascular: The extremities are warm and perfused.  Skin: no obvious rashes or lesions.  Musculoskeletal, Neurologic, and Spine:            Lumbar Spine:    Appearance - No gross stepoffs or deformities    Motor -     L2-3: Hip flexion 5/5 R and 5/5 L strength          L3/4:  Knee extension R 5/5 and L 5/5 strength         L4/5:  Foot dorsiflexion R 5/5 L 5/5 and       EHL dorsiflexion R 4/5 L 4/5  strength         S1:  Plantarflexion/Peroneal Muscles  R 5/5 and L 5/5 strength    Sensation: intact to light touch L3-S1 distribution BLE      Neurologic:      REFLEXES Left Right                  Patella 1+ 1+   Ankle jerk 1+ 1+   Babinski No upgoing great toe No upgoing great toe   Clonus 0 beats 0 beats     Hip Exam:  No pain with hip log roll and no tenderness over the greater trochanters.    Alignment:  Severe positive sagittal imbalance           Imaging:   We ordered and independently reviewed new radiographs at this clinic visit. The results were discussed with the patient.  Findings include:    Standing scoliosis radiographs today show severe positive sagittal imbalance with a long kyphotic thoracolumbar curve             Assessment and Plan:   Assessment:  79 year old female with rheumatoid arthritis and severe positive sagittal imbalance.     Plan:  Given her age and multiple medical issues I would not recommend a major thoracolumbar reconstruction.  I explained this involves putting in screws and rods and that with her rheumatoid disease and severe deformity she is at significant risk of fracture, infection and medical complications and I do not personally think I can get her through the surgery safely so I recommended against pursuing surgical intervention in her case.    I recommended a referral to physical therapy and the pain clinic for her low back pains.    Incidentally she also has rheumatoid type hands and she wondered if tendon transfers could be done.  I agreed to place a referral to our hand team so she could discuss this with them.      Respectfully,  Ruiz Hurt MD  Spine Surgery  [facility]

## 2024-03-29 DIAGNOSIS — M25.561 CHRONIC PAIN OF BOTH KNEES: Primary | ICD-10-CM

## 2024-03-29 DIAGNOSIS — G89.29 CHRONIC PAIN OF BOTH KNEES: Primary | ICD-10-CM

## 2024-03-29 DIAGNOSIS — M25.562 CHRONIC PAIN OF BOTH KNEES: Primary | ICD-10-CM

## 2024-04-03 ENCOUNTER — THERAPY VISIT (OUTPATIENT)
Dept: PHYSICAL THERAPY | Facility: CLINIC | Age: 79
End: 2024-04-03
Payer: COMMERCIAL

## 2024-04-03 DIAGNOSIS — M25.562 BILATERAL KNEE PAIN: ICD-10-CM

## 2024-04-03 DIAGNOSIS — Z96.653 STATUS POST TOTAL KNEE REPLACEMENT, BILATERAL: ICD-10-CM

## 2024-04-03 DIAGNOSIS — M05.79 RHEUMATOID ARTHRITIS INVOLVING MULTIPLE SITES WITH POSITIVE RHEUMATOID FACTOR (H): Primary | ICD-10-CM

## 2024-04-03 DIAGNOSIS — M25.561 BILATERAL KNEE PAIN: ICD-10-CM

## 2024-04-03 PROCEDURE — 97161 PT EVAL LOW COMPLEX 20 MIN: CPT | Mod: GP

## 2024-04-03 PROCEDURE — 97110 THERAPEUTIC EXERCISES: CPT | Mod: GP

## 2024-04-03 NOTE — PROGRESS NOTES
PHYSICAL THERAPY EVALUATION  Type of Visit: Evaluation    See electronic medical record for Abuse and Falls Screening details.    Subjective       Presenting condition or subjective complaint: Rheumatoid Arthritis in alfonso hands. Ongoing for over 10 years. Hard to do anything with her hands because of that. Pain is very minimal in hands. Hx of bilateral knee replacements as well. Notes she has intermittent pain with her knee from that.. Would like to focus on her hands/wrists. Knee pain is pretty minimal.   Date of onset: 03/19/24    Relevant medical history: Arthritis; Vision problems   Dates & types of surgery:      Prior diagnostic imaging/testing results:       Prior therapy history for the same diagnosis, illness or injury: Yes      Employment: No    Hobbies/Interests:      Patient goals for therapy: open hands.    Pain assessment: Pain denied     Objective     EVALUATION  POSTURE: Standing Posture: Forward head, Thoracic kyphosis increased, significant hip flexion  GAIT:   Weightbearing Status: WBAT  Assistive Device(s): Cane (quad)  Gait Deviations: Base of support decreased  Stride length decreased  Kacey decreased  ROM: Wrist Flex/Ext Max loss alfonso; pain free; Wrist Radial deviation max loss alfonso; ulnar deviation mod loss alfonso   OBSERVATION: Alfonso thumbs  demonstrate Z-shaped deformity  Boutonniere contracture along digits 2, 3, 4 alfonso   Rheumatoid nodules along MCP all digits and at radial styloid processes alfonso   STRENGTH:  strength L UE; <5lb ; R UE <2   FLEXIBILITY: significant loss of wrist flexors, extensor alfonso   PALPATION:  no TTP throughout hands/wrists   JOINT MOBILITY: wrist flex/ext, MCP mob all hypomobile and pain free.     Assessment & Plan   CLINICAL IMPRESSIONS  Medical Diagnosis: Rheumatoid arthritis involving multiple sites with positive rheumatoid factor (H)  Bilateral knee pain  Status post total knee replacement, bilateral    Treatment Diagnosis: Rheumatoid arthritis involving multiple  sites with positive rheumatoid factor (H)  Bilateral knee pain  Status post total knee replacement, bilateral   Impression/Assessment: Patient is a 79 year old female with lesli hands complaints.  The following significant findings have been identified: Decreased ROM/flexibility, Decreased joint mobility, Decreased strength, and Decreased activity tolerance. These impairments interfere with their ability to perform self care tasks, recreational activities, household mobility, and community mobility as compared to previous level of function.     Clinical Decision Making (Complexity):  Clinical Presentation: Stable/Uncomplicated  Clinical Presentation Rationale: based on medical and personal factors listed in PT evaluation  Clinical Decision Making (Complexity): Low complexity    PLAN OF CARE  Treatment Interventions:  Interventions: Gait Training, Manual Therapy, Neuromuscular Re-education, Therapeutic Activity, Therapeutic Exercise, Self-Care/Home Management    Long Term Goals     PT Goal 1  Goal Identifier: LTG 1  Goal Description: Patient will improve wrist AROM in order to put on clothes independently  Rationale: to maximize safety and independence with performance of ADLs and functional tasks  Target Date: 06/12/24      Frequency of Treatment: 1x/week taper as able  Duration of Treatment: 10 weeks    Recommended Referrals to Other Professionals: Physical Therapy  Education Assessment:   Learner/Method: Patient;Listening    Risks and benefits of evaluation/treatment have been explained.   Patient/Family/caregiver agrees with Plan of Care.     Evaluation Time:     PT Eval, Low Complexity Minutes (35889): 20     Patient may benefit from continued Hand PT specialty   Signing Clinician: Richelle Locke PT      [facility]                                                                                   OUTPATIENT PHYSICAL THERAPY      PLAN OF TREATMENT FOR OUTPATIENT REHABILITATION    Patient's Last Name, First Name, Yarelis Austin    YOB: 1945   Provider's Name   [facility]   Medical Record No.  [id number]     Onset Date: 03/19/24  Start of Care Date: 04/03/24     Medical Diagnosis:  Rheumatoid arthritis involving multiple sites with positive rheumatoid factor (H)  Bilateral knee pain  Status post total knee replacement, bilateral      PT Treatment Diagnosis:  Rheumatoid arthritis involving multiple sites with positive rheumatoid factor (H)  Bilateral knee pain  Status post total knee replacement, bilateral Plan of Treatment  Frequency/Duration: 1x/week taper as able/ 10 weeks    Certification date from 04/03/24 to 06/12/24         See note for plan of treatment details and functional goals     Richelle Locke, PT                         I CERTIFY THE NEED FOR THESE SERVICES FURNISHED UNDER        THIS PLAN OF TREATMENT AND WHILE UNDER MY CARE     (Physician attestation of this document indicates review and certification of the therapy plan).              Referring Provider:  No ref. provider found    Initial Assessment  See Epic Evaluation- Start of Care Date: 04/03/24

## 2024-04-06 NOTE — TELEPHONE ENCOUNTER
Action April 6, 2024 11:39 AM MT   Action Taken Sent a request for imaging from [facility].     Action April 8, 2024 8:54 AM MT   Action Taken Sent a request to [facility] STAT line for imaging.       DIAGNOSIS: Hands - Rheumatoid Arthritis   APPOINTMENT DATE: 04/08/2024   NOTES STATUS DETAILS   OFFICE NOTE from referring provider Internal 03/19/2024 - Ruiz Hurt MD - [facility]  Orthopaedic Surgery   OFFICE NOTE from other specialist Care Everywhere 09/28/2023 - Naida Adams DO - [facility]    04/25/2022 - Radha Christianson PA-C - [facility]    07/16/2021 - Muriel Byers MD - [facility]     XRAYS (IMAGES & REPORTS)  Internal  03/19/2024 - EOS Total Body    [facility]: IN PROCESS  04/25/2022 - Bilateral Hand    [facility]: PACS  07/16/2021 - Bilateral Hand

## 2024-04-08 ENCOUNTER — PRE VISIT (OUTPATIENT)
Dept: ORTHOPEDICS | Facility: CLINIC | Age: 79
End: 2024-04-08

## 2024-08-15 NOTE — PROGRESS NOTES
04/03/24 0500   Appointment Info   Signing clinician's name / credentials Richelle Locke, PT, DPT   Total/Authorized Visits 6 (PT)   Visits Used 1   Medical Diagnosis Rheumatoid arthritis involving multiple sites with positive rheumatoid factor (H)  Bilateral knee pain  Status post total knee replacement, bilateral   PT Tx Diagnosis Rheumatoid arthritis involving multiple sites with positive rheumatoid factor (H)  Bilateral knee pain  Status post total knee replacement, bilateral   Other pertinent information talk to patient about following up with Hand OT - [facility], [facility] and [facility] locations offer hand OT - will need another referral from doc   Quick Adds Certification   Progress Note/Certification   Start of Care Date 04/03/24   Onset of illness/injury or Date of Surgery 03/19/24   Therapy Frequency 1x/week taper as able   Predicted Duration 10 weeks   Certification date from 04/03/24   Certification date to 06/12/24   Progress Note Completed Date 04/03/24       Present No   PT Goal 1   Goal Identifier LTG 1   Goal Description Patient will improve wrist AROM in order to put on clothes independently   Rationale to maximize safety and independence with performance of ADLs and functional tasks   Target Date 06/12/24   Subjective Report   Subjective Report Severe RA in lesli hands; no pain but motion and strength severely limited   Objective Measures   Objective Measures Objective Measure 1   Objective Measure 1   Objective Measure Significantly limited mobility in lesli wrists and fingers   Details Hand  Strength: L UE; <5lb ; R UE <2   Treatment Interventions (PT)   Interventions Therapeutic Procedure/Exercise;Manual Therapy   Therapeutic Procedure/Exercise   PTRx Ther Proc 1 Hand Strengthening Gripping   PTRx Ther Proc 1 - Details  squeeze 2 x 10; both hands ; slight pain at L middle finger at PIP medially    PTRx Ther Proc 2 Forearm PROM Advanced Flexor Stretch   PTRx Ther Proc 2 -  Details x 30 sec hold lesli    PTRx Ther Proc 3 Forearm Passive Range of Motion Advanced Extensor Stretch   PTRx Ther Proc 3 - Details x30sec hold; lesli ; no pain    PTRx Ther Proc 4 Towel Gathering and Spreading   PTRx Ther Proc 4 - Details x 10; encouragement of movement as much as able within pain free ROM  ;very limited ROM    Therapeutic Procedures: strength, endurance, ROM, flexibility minutes (36197) 12   Skilled Intervention see above   Patient Response/Progress tolerated well;   Manual Therapy   Manual Therapy: Mobilization, MFR, MLD, friction massage minutes (73953) 5   Manual Therapy 1 gentle grade I distal wrist mop, gentle MCP mob lesli   Manual Therapy 1 - Details no pain; hypomobile throughout   Skilled Intervention improve mobility   Patient Response/Progress tolerated well   Eval/Assessments   PT Eval, Low Complexity Minutes (56443) 20   Education   Learner/Method Patient;Listening   Plan   Home program print   Plan for next session wrist mobility; gentle wrist/MCP mob; gentle hand instrinsic strengthening   Total Session Time   Timed Code Treatment Minutes 17   Total Treatment Time (sum of timed and untimed services) 37           DISCHARGE  Reason for Discharge: Patient has failed to schedule further appointments.    Equipment Issued: none    Discharge Plan: Patient to continue home program.    Referring Provider:  Ruiz Hurt